# Patient Record
Sex: MALE | Race: BLACK OR AFRICAN AMERICAN | URBAN - METROPOLITAN AREA
[De-identification: names, ages, dates, MRNs, and addresses within clinical notes are randomized per-mention and may not be internally consistent; named-entity substitution may affect disease eponyms.]

---

## 2023-06-22 ENCOUNTER — OFFICE VISIT (OUTPATIENT)
Dept: URBAN - METROPOLITAN AREA CLINIC 91 | Facility: CLINIC | Age: 58
End: 2023-06-22
Payer: COMMERCIAL

## 2023-06-22 DIAGNOSIS — H43.813 VITREOUS DEGENERATION, BILATERAL: ICD-10-CM

## 2023-06-22 DIAGNOSIS — H18.613 KERATOCONUS, STABLE, BILATERAL: Primary | ICD-10-CM

## 2023-06-22 DIAGNOSIS — H25.13 AGE-RELATED NUCLEAR CATARACT, BILATERAL: ICD-10-CM

## 2023-06-22 DIAGNOSIS — H40.013 OPEN ANGLE WITH BORDERLINE FINDINGS, LOW RISK, BILATERAL: ICD-10-CM

## 2023-06-22 DIAGNOSIS — H17.89 OTHER CORNEAL SCAR: ICD-10-CM

## 2023-06-22 PROCEDURE — 99204 OFFICE O/P NEW MOD 45 MIN: CPT | Performed by: OPHTHALMOLOGY

## 2023-06-22 PROCEDURE — 92025 CPTRIZED CORNEAL TOPOGRAPHY: CPT | Performed by: OPHTHALMOLOGY

## 2023-06-22 ASSESSMENT — INTRAOCULAR PRESSURE
OD: 13
OS: 15

## 2023-06-22 ASSESSMENT — VISUAL ACUITY
OS: 20/100
OD: 20/80

## 2023-06-22 NOTE — IMPRESSION/PLAN
Impression: Keratoconus, stable, bilateral: H18.723. Plan: For keratoconus, I have discussed all possible treatment options including contact lens, placement of Intacs corneal rings and corneal transplant. I have also explained progressive nature of this condition.

## 2023-06-22 NOTE — IMPRESSION/PLAN
Impression: Open angle with borderline findings, low risk, bilateral: H40.013. Plan: Due to large cups explained glaucoma suspect status of both eyes. I have recommended patient have baseline glaucoma testing including visual field, tomography, fundus photo, pachymetry and gonioscopy. I have also stressed the importance of close monitoring, and patient compliance with follow-up appointments. The risk of blindness, loss of vision and need for possible surgery was explained in detail to patient. unknown family hx.

## 2023-09-21 ENCOUNTER — OFFICE VISIT (OUTPATIENT)
Dept: URBAN - METROPOLITAN AREA CLINIC 91 | Facility: CLINIC | Age: 58
End: 2023-09-21
Payer: COMMERCIAL

## 2023-09-21 DIAGNOSIS — H40.013 OPEN ANGLE WITH BORDERLINE FINDINGS, LOW RISK, BILATERAL: Primary | ICD-10-CM

## 2023-09-21 DIAGNOSIS — H18.221 IDIOPATHIC CORNEAL EDEMA, RIGHT EYE: ICD-10-CM

## 2023-09-21 PROCEDURE — 92133 CPTRZD OPH DX IMG PST SGM ON: CPT | Performed by: OPHTHALMOLOGY

## 2023-09-21 PROCEDURE — 92083 EXTENDED VISUAL FIELD XM: CPT | Performed by: OPHTHALMOLOGY

## 2023-09-21 PROCEDURE — 99213 OFFICE O/P EST LOW 20 MIN: CPT | Performed by: OPHTHALMOLOGY

## 2023-09-21 PROCEDURE — 76514 ECHO EXAM OF EYE THICKNESS: CPT | Performed by: OPHTHALMOLOGY

## 2023-09-21 RX ORDER — PREDNISOLONE ACETATE 10 MG/ML
1 % SUSPENSION/ DROPS OPHTHALMIC
Qty: 10 | Refills: 1 | Status: ACTIVE
Start: 2023-09-21

## 2023-09-21 ASSESSMENT — INTRAOCULAR PRESSURE
OS: 16
OD: 15

## 2023-10-05 ENCOUNTER — OFFICE VISIT (OUTPATIENT)
Dept: URBAN - METROPOLITAN AREA CLINIC 91 | Facility: CLINIC | Age: 58
End: 2023-10-05
Payer: COMMERCIAL

## 2023-10-05 DIAGNOSIS — H18.221 IDIOPATHIC CORNEAL EDEMA, RIGHT EYE: Primary | ICD-10-CM

## 2023-10-05 PROCEDURE — 92025 CPTRIZED CORNEAL TOPOGRAPHY: CPT | Performed by: OPHTHALMOLOGY

## 2023-10-05 PROCEDURE — 99213 OFFICE O/P EST LOW 20 MIN: CPT | Performed by: OPHTHALMOLOGY

## 2023-10-05 ASSESSMENT — INTRAOCULAR PRESSURE
OS: 14
OD: 13

## 2023-11-02 ENCOUNTER — OFFICE VISIT (OUTPATIENT)
Dept: URBAN - METROPOLITAN AREA CLINIC 91 | Facility: CLINIC | Age: 58
End: 2023-11-02
Payer: COMMERCIAL

## 2023-11-02 DIAGNOSIS — H18.221 IDIOPATHIC CORNEAL EDEMA, RIGHT EYE: Primary | ICD-10-CM

## 2023-11-02 PROCEDURE — 99213 OFFICE O/P EST LOW 20 MIN: CPT | Performed by: OPHTHALMOLOGY

## 2023-11-02 ASSESSMENT — INTRAOCULAR PRESSURE
OD: 15
OS: 16